# Patient Record
Sex: MALE | Race: BLACK OR AFRICAN AMERICAN | NOT HISPANIC OR LATINO | Employment: OTHER | URBAN - METROPOLITAN AREA
[De-identification: names, ages, dates, MRNs, and addresses within clinical notes are randomized per-mention and may not be internally consistent; named-entity substitution may affect disease eponyms.]

---

## 2021-09-29 ENCOUNTER — HOSPITAL ENCOUNTER (EMERGENCY)
Facility: HOSPITAL | Age: 71
Discharge: HOME | End: 2021-09-29
Attending: EMERGENCY MEDICINE
Payer: MEDICARE

## 2021-09-29 VITALS
SYSTOLIC BLOOD PRESSURE: 136 MMHG | RESPIRATION RATE: 18 BRPM | WEIGHT: 170 LBS | TEMPERATURE: 98 F | HEIGHT: 71 IN | DIASTOLIC BLOOD PRESSURE: 70 MMHG | BODY MASS INDEX: 23.8 KG/M2 | HEART RATE: 74 BPM | OXYGEN SATURATION: 99 %

## 2021-09-29 DIAGNOSIS — M25.531 RIGHT WRIST PAIN: ICD-10-CM

## 2021-09-29 LAB
ALBUMIN SERPL-MCNC: 4 G/DL (ref 3.3–5.5)
ALLENS TEST: ABNORMAL
ALP SERPL-CCNC: 117 U/L (ref 42–141)
BILIRUB SERPL-MCNC: 0.7 MG/DL (ref 0.2–1.6)
BUN SERPL-MCNC: 11 MG/DL (ref 7–22)
CALCIUM SERPL-MCNC: 10.4 MG/DL (ref 8–10.3)
CHLORIDE SERPL-SCNC: 101 MMOL/L (ref 98–108)
CREAT SERPL-MCNC: 1.2 MG/DL (ref 0.6–1.2)
CRP SERPL-MCNC: 39.4 MG/L (ref 0–8.2)
ERYTHROCYTE [SEDIMENTATION RATE] IN BLOOD BY WESTERGREN METHOD: 75 MM/HR (ref 0–10)
GLUCOSE SERPL-MCNC: 88 MG/DL (ref 73–118)
HCO3 UR-SCNC: 26.7 MMOL/L (ref 24–28)
LDH SERPL L TO P-CCNC: 1.66 MMOL/L (ref 0.5–2.2)
PCO2 BLDA: 46.2 MMHG (ref 35–45)
PH SMN: 7.37 [PH] (ref 7.35–7.45)
PO2 BLDA: 30 MMHG (ref 40–60)
POC ALT (SGPT): 15 U/L (ref 10–47)
POC AST (SGOT): 23 U/L (ref 11–38)
POC BE: 1 MMOL/L
POC SATURATED O2: 55 % (ref 95–100)
POC TCO2: 27 MMOL/L (ref 18–33)
POC TCO2: 28 MMOL/L (ref 24–29)
POTASSIUM BLD-SCNC: 3.9 MMOL/L (ref 3.6–5.1)
PROTEIN, POC: 8.3 G/DL (ref 6.4–8.1)
SAMPLE: ABNORMAL
SITE: ABNORMAL
SODIUM BLD-SCNC: 139 MMOL/L (ref 128–145)
URATE SERPL-MCNC: 4.3 MG/DL (ref 3.4–7)

## 2021-09-29 PROCEDURE — 85652 RBC SED RATE AUTOMATED: CPT | Performed by: EMERGENCY MEDICINE

## 2021-09-29 PROCEDURE — 84550 ASSAY OF BLOOD/URIC ACID: CPT | Performed by: EMERGENCY MEDICINE

## 2021-09-29 PROCEDURE — 86140 C-REACTIVE PROTEIN: CPT | Performed by: EMERGENCY MEDICINE

## 2021-09-29 PROCEDURE — 99284 EMERGENCY DEPT VISIT MOD MDM: CPT | Mod: 25,ER

## 2021-09-29 PROCEDURE — 25000003 PHARM REV CODE 250: Mod: ER | Performed by: EMERGENCY MEDICINE

## 2021-09-29 PROCEDURE — 85025 COMPLETE CBC W/AUTO DIFF WBC: CPT | Mod: ER

## 2021-09-29 PROCEDURE — 82803 BLOOD GASES ANY COMBINATION: CPT | Mod: ER

## 2021-09-29 PROCEDURE — 80053 COMPREHEN METABOLIC PANEL: CPT | Mod: ER

## 2021-09-29 RX ORDER — OXYCODONE AND ACETAMINOPHEN 5; 325 MG/1; MG/1
1 TABLET ORAL
Status: COMPLETED | OUTPATIENT
Start: 2021-09-29 | End: 2021-09-29

## 2021-09-29 RX ORDER — COLCHICINE 0.6 MG/1
0.6 TABLET, FILM COATED ORAL
Status: COMPLETED | OUTPATIENT
Start: 2021-09-29 | End: 2021-09-29

## 2021-09-29 RX ORDER — DICLOFENAC SODIUM 10 MG/G
2 GEL TOPICAL 4 TIMES DAILY
Qty: 1 TUBE | Refills: 0 | OUTPATIENT
Start: 2021-09-29 | End: 2021-10-30

## 2021-09-29 RX ORDER — COLCHICINE 0.6 MG/1
1.2 TABLET, FILM COATED ORAL
Status: COMPLETED | OUTPATIENT
Start: 2021-09-29 | End: 2021-09-29

## 2021-09-29 RX ORDER — IBUPROFEN 600 MG/1
600 TABLET ORAL EVERY 6 HOURS PRN
Qty: 20 TABLET | Refills: 0 | OUTPATIENT
Start: 2021-09-29 | End: 2021-10-30

## 2021-09-29 RX ORDER — ACETAMINOPHEN 500 MG
1000 TABLET ORAL EVERY 6 HOURS PRN
Qty: 30 TABLET | Refills: 0 | Status: SHIPPED | OUTPATIENT
Start: 2021-09-29

## 2021-09-29 RX ORDER — METHOCARBAMOL 750 MG/1
750 TABLET, FILM COATED ORAL 3 TIMES DAILY PRN
Qty: 30 TABLET | Refills: 0 | Status: SHIPPED | OUTPATIENT
Start: 2021-09-29 | End: 2021-10-04

## 2021-09-29 RX ADMIN — COLCHICINE 0.6 MG: 0.6 TABLET, FILM COATED ORAL at 03:09

## 2021-09-29 RX ADMIN — COLCHICINE 1.2 MG: 0.6 TABLET, FILM COATED ORAL at 01:09

## 2021-09-29 RX ADMIN — OXYCODONE AND ACETAMINOPHEN 1 TABLET: 5; 325 TABLET ORAL at 01:09

## 2021-10-30 ENCOUNTER — HOSPITAL ENCOUNTER (EMERGENCY)
Facility: HOSPITAL | Age: 71
Discharge: HOME | End: 2021-10-30
Attending: EMERGENCY MEDICINE
Payer: MEDICARE

## 2021-10-30 VITALS
WEIGHT: 172 LBS | HEART RATE: 89 BPM | SYSTOLIC BLOOD PRESSURE: 149 MMHG | HEIGHT: 71 IN | TEMPERATURE: 98 F | OXYGEN SATURATION: 99 % | DIASTOLIC BLOOD PRESSURE: 66 MMHG | BODY MASS INDEX: 24.08 KG/M2 | RESPIRATION RATE: 17 BRPM

## 2021-10-30 DIAGNOSIS — M19.031 ARTHRITIS OF RIGHT WRIST: Primary | ICD-10-CM

## 2021-10-30 PROCEDURE — 99283 EMERGENCY DEPT VISIT LOW MDM: CPT | Mod: 25,ER

## 2021-10-30 PROCEDURE — 29125 APPL SHORT ARM SPLINT STATIC: CPT | Mod: ER,RT

## 2021-10-30 PROCEDURE — 63600175 PHARM REV CODE 636 W HCPCS: Mod: ER | Performed by: EMERGENCY MEDICINE

## 2021-10-30 RX ORDER — PREDNISONE 20 MG/1
60 TABLET ORAL
Status: COMPLETED | OUTPATIENT
Start: 2021-10-30 | End: 2021-10-30

## 2021-10-30 RX ORDER — PREDNISONE 20 MG/1
60 TABLET ORAL DAILY
Qty: 15 TABLET | Refills: 0 | Status: SHIPPED | OUTPATIENT
Start: 2021-10-30 | End: 2021-11-04

## 2021-10-30 RX ADMIN — PREDNISONE 60 MG: 20 TABLET ORAL at 08:10

## 2022-03-18 ENCOUNTER — HOSPITAL ENCOUNTER (EMERGENCY)
Facility: HOSPITAL | Age: 72
Discharge: HOME | End: 2022-03-19
Attending: INTERNAL MEDICINE
Payer: MEDICARE

## 2022-03-18 DIAGNOSIS — M54.50 ACUTE RIGHT-SIDED LOW BACK PAIN WITHOUT SCIATICA: Primary | ICD-10-CM

## 2022-03-18 PROCEDURE — 99285 EMERGENCY DEPT VISIT HI MDM: CPT | Mod: 25,ER

## 2022-03-19 VITALS
BODY MASS INDEX: 23.8 KG/M2 | RESPIRATION RATE: 18 BRPM | WEIGHT: 170 LBS | OXYGEN SATURATION: 98 % | HEART RATE: 70 BPM | DIASTOLIC BLOOD PRESSURE: 66 MMHG | TEMPERATURE: 99 F | HEIGHT: 71 IN | SYSTOLIC BLOOD PRESSURE: 149 MMHG

## 2022-03-19 PROBLEM — M54.50 ACUTE RIGHT-SIDED LOW BACK PAIN WITHOUT SCIATICA: Status: ACTIVE | Noted: 2022-03-19

## 2022-03-19 LAB
BILIRUBIN, POC UA: NEGATIVE
BLOOD, POC UA: ABNORMAL
CLARITY, POC UA: CLEAR
COLOR, POC UA: YELLOW
GLUCOSE, POC UA: NEGATIVE
KETONES, POC UA: NEGATIVE
LEUKOCYTE EST, POC UA: NEGATIVE
NITRITE, POC UA: NEGATIVE
PH UR STRIP: 6 [PH]
PROTEIN, POC UA: NEGATIVE
SPECIFIC GRAVITY, POC UA: 1.02
UROBILINOGEN, POC UA: 0.2 E.U./DL

## 2022-03-19 PROCEDURE — 63600175 PHARM REV CODE 636 W HCPCS: Mod: ER | Performed by: INTERNAL MEDICINE

## 2022-03-19 PROCEDURE — 96372 THER/PROPH/DIAG INJ SC/IM: CPT | Performed by: INTERNAL MEDICINE

## 2022-03-19 PROCEDURE — 81003 URINALYSIS AUTO W/O SCOPE: CPT | Mod: ER

## 2022-03-19 RX ORDER — KETOROLAC TROMETHAMINE 30 MG/ML
15 INJECTION, SOLUTION INTRAMUSCULAR; INTRAVENOUS
Status: DISCONTINUED | OUTPATIENT
Start: 2022-03-19 | End: 2022-03-19

## 2022-03-19 RX ORDER — IBUPROFEN 600 MG/1
600 TABLET ORAL 3 TIMES DAILY
Qty: 30 TABLET | Refills: 0 | Status: SHIPPED | OUTPATIENT
Start: 2022-03-19

## 2022-03-19 RX ORDER — KETOROLAC TROMETHAMINE 30 MG/ML
30 INJECTION, SOLUTION INTRAMUSCULAR; INTRAVENOUS
Status: COMPLETED | OUTPATIENT
Start: 2022-03-19 | End: 2022-03-19

## 2022-03-19 RX ADMIN — KETOROLAC TROMETHAMINE 30 MG: 30 INJECTION, SOLUTION INTRAMUSCULAR; INTRAVENOUS at 12:03

## 2022-03-19 NOTE — ED PROVIDER NOTES
Encounter Date: 3/18/2022    SCRIBE #1 NOTE: I, Alex Sinha, am scribing for, and in the presence of,  Dayne Bradford MD. I have scribed the following portions of the note - Other sections scribed: HPI, ROS, PE.       History     Chief Complaint   Patient presents with    Flank Pain     Pt presents to ER with c/o rt flank pain that started earlier today, denies any fever, urinary symptoms or nausea and vomiting.      Patient is a 71 year old male who presents to ED with complaints of intermittent right lower back pain onset last night. He notes worsening pain throughout the day. Patient denies fever, nausea, or vomiting. No other complaints at this time.     The history is provided by the patient. No  was used.     Review of patient's allergies indicates:  No Known Allergies  No past medical history on file.  Past Surgical History:   Procedure Laterality Date    AMPUTATION  1972    right leg     PROSTATE SURGERY       No family history on file.     Review of Systems   Constitutional: Negative for fever.   HENT: Negative for sore throat.    Respiratory: Negative for shortness of breath.    Cardiovascular: Negative for chest pain.   Gastrointestinal: Negative for diarrhea, nausea and vomiting.   Genitourinary: Negative for dysuria.   Musculoskeletal: Positive for back pain.   Skin: Negative for rash.   Neurological: Negative for weakness and headaches.   Psychiatric/Behavioral: Negative for behavioral problems.   All other systems reviewed and are negative.      Physical Exam     Initial Vitals [03/18/22 2246]   BP Pulse Resp Temp SpO2   (!) 165/95 78 18 98 °F (36.7 °C) 100 %      MAP       --         Physical Exam    Nursing note and vitals reviewed.  Constitutional: He appears well-developed and well-nourished.   HENT:   Head: Normocephalic and atraumatic.   Eyes: Conjunctivae are normal.   Neck: Neck supple.   Normal range of motion.  Cardiovascular: Normal rate, regular rhythm and normal  heart sounds. Exam reveals no gallop and no friction rub.    No murmur heard.  Pulmonary/Chest: Breath sounds normal. No respiratory distress. He has no wheezes. He has no rhonchi. He has no rales.   Abdominal: Abdomen is soft. There is no abdominal tenderness.   Musculoskeletal:         General: No edema. Normal range of motion.      Cervical back: Normal range of motion and neck supple.      Comments: Right lower back pain upon movement. Non tender to palpation.  Right AKA      Neurological: He is alert and oriented to person, place, and time. GCS score is 15. GCS eye subscore is 4. GCS verbal subscore is 5. GCS motor subscore is 6.   Skin: Skin is warm and dry.   Psychiatric: He has a normal mood and affect.         ED Course   Procedures  Labs Reviewed   POCT URINALYSIS W/O SCOPE - Abnormal; Notable for the following components:       Result Value    Blood, UA Trace-intact (*)     All other components within normal limits   POCT URINALYSIS W/O SCOPE          Imaging Results          CT Renal Stone Study ABD Pelvis WO (Final result)  Result time 03/19/22 01:16:46    Final result by Matt Pierson MD (03/19/22 01:16:46)                 Impression:      There is no evidence for ureteral calculus or obstructive uropathy bilaterally.    Small urinary bladder diverticulum on the left.    Diverticula of the colon are noted, there is no evidence for acute diverticulitis.    Postoperative change of the pelvis.    Additional findings as above.      Electronically signed by: Matt Pierson  Date:    03/19/2022  Time:    01:16             Narrative:    EXAMINATION:  CT RENAL STONE STUDY ABD PELVIS WO    CLINICAL HISTORY:  Flank pain, kidney stone suspected;    TECHNIQUE:  Low dose axial images, sagittal and coronal reformations were obtained from the lung bases to the pubic symphysis.  Contrast was not administered.    COMPARISON:  None    FINDINGS:  There is no evidence for hydronephrosis or perinephric inflammatory  change bilaterally.  The ureters are difficult to delineate in their entirety along their course to the urinary bladder however there is no evidence for hydroureter, ureteral calculus or obstructive uropathy bilaterally.  There is a small diverticulum associated with the left side of the urinary bladder.    The lung bases demonstrate atelectatic change, there are pleural calcifications noted.  When accounting for limitations of the examination, there is no evidence for acute process of the stomach, liver, gallbladder, pancreas, spleen or adrenal glands.  The abdominal aorta demonstrates atherosclerotic change, appears normal in caliber otherwise not optimally evaluated on this examination.  Postprocedural prostatic changes are noted.    There is no evidence for small bowel obstructive process.  There is a structure thought to represent the appendix, not seen in its entirety, it does not appear inflamed.  There is mild stool throughout the colon, diverticula of the colon are noted, there is no evidence for acute diverticulitis.  There is no evidence for free intraperitoneal air.    Postoperative change of the pelvis is noted there is appearance of resection of a portion of the right iliac bone, as well as the right acetabulum, the right superior and inferior pubic ramus and right lower extremity.  Clinical and historical correlation is needed.  The osseous structures otherwise demonstrate chronic appearing change.                                 Medications   ketorolac injection 30 mg (30 mg Intramuscular Given 3/19/22 0048)     Medical Decision Making:   History:   Old Medical Records: I decided to obtain old medical records.  Initial Assessment:   Patient is a 71 year old male who presents to ED with complaints of intermittent right lower back pain onset last night. He notes worsening pain throughout the day. Patient denies fever, nausea, or vomiting. No other complaints at this time.   Clinical Tests:   Lab Tests:  Ordered and Reviewed  Radiological Study: Ordered and Reviewed  ED Management:  Urinalysis revealed trace blood without other signs of infection.  CT of the abdomen and pelvis using renal stone protocol revealed no evidence of renal stone.  Patient was given instructions for low back pain and received Toradol in the emergency department.  He states he feels better after medication and was advised to follow up with primary care physician within the next 3 days for re-evaluation/return to the emergency department if condition worsens.          Scribe Attestation:   Scribe #1: I performed the above scribed service and the documentation accurately describes the services I performed. I attest to the accuracy of the note.                   This document was produced by a scribe under my direction and in my presence. I agree with the content of the note and have made any necessary edits.     Dr. Bradford    03/19/2022 5:18 AM    Clinical Impression:   Final diagnoses:  [M54.50] Acute right-sided low back pain without sciatica (Primary)          ED Disposition Condition    Discharge Stable        ED Prescriptions     Medication Sig Dispense Start Date End Date Auth. Provider    ibuprofen (ADVIL,MOTRIN) 600 MG tablet Take 1 tablet (600 mg total) by mouth 3 (three) times daily. 30 tablet 3/19/2022  Dayne Bradford MD        Follow-up Information    None          Dayne Bradford MD  03/19/22 0959